# Patient Record
Sex: FEMALE | Race: OTHER | NOT HISPANIC OR LATINO
[De-identification: names, ages, dates, MRNs, and addresses within clinical notes are randomized per-mention and may not be internally consistent; named-entity substitution may affect disease eponyms.]

---

## 2021-08-12 PROBLEM — Z00.00 ENCOUNTER FOR PREVENTIVE HEALTH EXAMINATION: Status: ACTIVE | Noted: 2021-08-12

## 2021-09-15 ENCOUNTER — APPOINTMENT (OUTPATIENT)
Dept: ORTHOPEDIC SURGERY | Facility: CLINIC | Age: 51
End: 2021-09-15

## 2022-05-20 ENCOUNTER — APPOINTMENT (OUTPATIENT)
Dept: GASTROENTEROLOGY | Facility: CLINIC | Age: 52
End: 2022-05-20
Payer: MEDICAID

## 2022-05-20 VITALS
TEMPERATURE: 98 F | DIASTOLIC BLOOD PRESSURE: 80 MMHG | OXYGEN SATURATION: 98 % | WEIGHT: 136 LBS | HEIGHT: 59 IN | HEART RATE: 60 BPM | RESPIRATION RATE: 16 BRPM | BODY MASS INDEX: 27.42 KG/M2 | SYSTOLIC BLOOD PRESSURE: 110 MMHG

## 2022-05-20 DIAGNOSIS — Z80.2 FAMILY HISTORY OF MALIGNANT NEOPLASM OF OTHER RESPIRATORY AND INTRATHORACIC ORGANS: ICD-10-CM

## 2022-05-20 DIAGNOSIS — Z87.19 PERSONAL HISTORY OF OTHER DISEASES OF THE DIGESTIVE SYSTEM: ICD-10-CM

## 2022-05-20 DIAGNOSIS — Z78.9 OTHER SPECIFIED HEALTH STATUS: ICD-10-CM

## 2022-05-20 DIAGNOSIS — Z86.2 PERSONAL HISTORY OF DISEASES OF THE BLOOD AND BLOOD-FORMING ORGANS AND CERTAIN DISORDERS INVOLVING THE IMMUNE MECHANISM: ICD-10-CM

## 2022-05-20 DIAGNOSIS — Z86.39 PERSONAL HISTORY OF OTHER ENDOCRINE, NUTRITIONAL AND METABOLIC DISEASE: ICD-10-CM

## 2022-05-20 PROCEDURE — 99204 OFFICE O/P NEW MOD 45 MIN: CPT

## 2022-05-20 RX ORDER — NICOTINE POLACRILEX 2 MG
GUM BUCCAL
Refills: 0 | Status: ACTIVE | COMMUNITY

## 2022-05-20 NOTE — ASSESSMENT
[FreeTextEntry1] : The patient has chronic constipation which is probably due to colonic inertia.  However she is due for routine follow-up screening colonoscopy.  She may actually be somewhat late if she did indeed have polyps.  She will utilize a MiraLAX prep with additional bottle of mag citrate and 2 Dulcolax 2 days prior.  I recommended that she take Benefiber 1 heaping tablespoon in 6 to 8 ounces of fluid every morning and MiraLAX every evening as well as docusate sodium 100 mg p.o. twice daily and that she try to decrease her use of Dulcolax.  In view of the new onset of heartburn she will also be scheduled for an upper endoscopy to exclude the possibility of reflux esophagitis or Peña's esophagus.  Further therapy will depend upon the results.  She will also obtain a CBC, basic metabolic profile, celiac antibodies and thyroid function tests.  She will obtain iron studies as well. The risks, benefits, complications and possible adverse consequences associated with colonoscopy were discussed with the patient. The risks, benefits, complications and possible adverse consequences associated with upper endoscopy were discussed with the patient.\par \par

## 2022-05-20 NOTE — PHYSICAL EXAM
[General Appearance - Alert] : alert [General Appearance - In No Acute Distress] : in no acute distress [General Appearance - Well Nourished] : well nourished [General Appearance - Well Developed] : well developed [General Appearance - Well-Appearing] : healthy appearing [Sclera] : the sclera and conjunctiva were normal [PERRL With Normal Accommodation] : pupils were equal in size, round, and reactive to light [Extraocular Movements] : extraocular movements were intact [Outer Ear] : the ears and nose were normal in appearance [Hearing Threshold Finger Rub Not [place]] : hearing was normal [Examination Of The Oral Cavity] : the lips and gums were normal [Neck Appearance] : the appearance of the neck was normal [Auscultation Breath Sounds / Voice Sounds] : lungs were clear to auscultation bilaterally [Heart Rate And Rhythm] : heart rate was normal and rhythm regular [Heart Sounds] : normal S1 and S2 [Heart Sounds Gallop] : no gallops [Murmurs] : no murmurs [Heart Sounds Pericardial Friction Rub] : no pericardial rub [Bowel Sounds] : normal bowel sounds [Abdomen Soft] : soft [Abdomen Tenderness] : non-tender [Abdomen Mass (___ Cm)] : no abdominal mass palpated [Abnormal Walk] : normal gait [Nail Clubbing] : no clubbing  or cyanosis of the fingernails [Musculoskeletal - Swelling] : no joint swelling seen [Motor Tone] : muscle strength and tone were normal [Skin Color & Pigmentation] : normal skin color and pigmentation [Skin Turgor] : normal skin turgor [] : no rash [Motor Exam] : the motor exam was normal [No Focal Deficits] : no focal deficits [Oriented To Time, Place, And Person] : oriented to person, place, and time [Impaired Insight] : insight and judgment were intact [Affect] : the affect was normal

## 2022-05-20 NOTE — HISTORY OF PRESENT ILLNESS
[de-identified] : The patient has a history of chronic constipation and the frequency of her bowel movements depends upon how often she takes a laxative and her diet.  When she consumes a high-fiber diet she will have more normal and regular bowel movements but otherwise she may not have a bowel movement for 2 to 3 days and will take Dulcolax averaging 2 or 3 times weekly.  Her stools are frequently quite firm.  There is no rectal bleeding or melena.  She apparently underwent a colonoscopy 10 to 12 years ago and may have undergone removal of a polyp and was told to undergo a follow-up exam in 5 years.  For the past 2 months she has been experiencing recurrent heartburn averaging 2 or 3 times weekly but there is no dysphagia.  There is no nausea or vomiting.  The heartburn is more likely to occur when she lies down.  Her appetite and weight are stable.  She still has her menses and apparently has a diagnosis of anemia for which she takes 1 iron tablet daily.

## 2022-07-05 LAB
ANION GAP SERPL CALC-SCNC: 11 MMOL/L
BASOPHILS # BLD AUTO: 0.05 K/UL
BASOPHILS NFR BLD AUTO: 0.9 %
BUN SERPL-MCNC: 13 MG/DL
CALCIUM SERPL-MCNC: 9.2 MG/DL
CHLORIDE SERPL-SCNC: 105 MMOL/L
CO2 SERPL-SCNC: 23 MMOL/L
CREAT SERPL-MCNC: 0.7 MG/DL
CRP SERPL-MCNC: <3 MG/L
EGFR: 104 ML/MIN/1.73M2
ENDOMYSIUM IGA SER QL: NEGATIVE
ENDOMYSIUM IGA TITR SER: NORMAL
EOSINOPHIL # BLD AUTO: 0.11 K/UL
EOSINOPHIL NFR BLD AUTO: 1.9 %
GLIADIN IGA SER QL: <5 UNITS
GLIADIN IGG SER QL: <5 UNITS
GLIADIN PEPTIDE IGA SER-ACNC: NEGATIVE
GLIADIN PEPTIDE IGG SER-ACNC: NEGATIVE
GLUCOSE SERPL-MCNC: 87 MG/DL
HCT VFR BLD CALC: 41.3 %
HGB BLD-MCNC: 12.7 G/DL
IGA SER QL IEP: 182 MG/DL
IMM GRANULOCYTES NFR BLD AUTO: 0.2 %
LYMPHOCYTES # BLD AUTO: 1.9 K/UL
LYMPHOCYTES NFR BLD AUTO: 33 %
MAN DIFF?: NORMAL
MCHC RBC-ENTMCNC: 29.4 PG
MCHC RBC-ENTMCNC: 30.8 GM/DL
MCV RBC AUTO: 95.6 FL
MONOCYTES # BLD AUTO: 0.43 K/UL
MONOCYTES NFR BLD AUTO: 7.5 %
NEUTROPHILS # BLD AUTO: 3.25 K/UL
NEUTROPHILS NFR BLD AUTO: 56.5 %
PLATELET # BLD AUTO: 310 K/UL
POTASSIUM SERPL-SCNC: 4.3 MMOL/L
RBC # BLD: 4.32 M/UL
RBC # FLD: 16.9 %
SODIUM SERPL-SCNC: 138 MMOL/L
T3FREE SERPL-MCNC: 2.52 PG/ML
T4 FREE SERPL-MCNC: 0.8 NG/DL
TSH SERPL-ACNC: 2.63 UIU/ML
TTG IGA SER IA-ACNC: 6.2 U/ML
TTG IGA SER-ACNC: ABNORMAL
TTG IGG SER IA-ACNC: 4.5 U/ML
TTG IGG SER IA-ACNC: NEGATIVE
WBC # FLD AUTO: 5.75 K/UL

## 2022-07-11 ENCOUNTER — TRANSCRIPTION ENCOUNTER (OUTPATIENT)
Age: 52
End: 2022-07-11

## 2022-07-12 ENCOUNTER — APPOINTMENT (OUTPATIENT)
Dept: GASTROENTEROLOGY | Facility: GI CENTER | Age: 52
End: 2022-07-12

## 2022-07-12 ENCOUNTER — RESULT REVIEW (OUTPATIENT)
Age: 52
End: 2022-07-12

## 2022-07-12 ENCOUNTER — OUTPATIENT (OUTPATIENT)
Dept: OUTPATIENT SERVICES | Facility: HOSPITAL | Age: 52
LOS: 1 days | End: 2022-07-12
Payer: COMMERCIAL

## 2022-07-12 DIAGNOSIS — K21.9 GASTRO-ESOPHAGEAL REFLUX DISEASE W/OUT ESOPHAGITIS: ICD-10-CM

## 2022-07-12 DIAGNOSIS — K59.09 OTHER CONSTIPATION: ICD-10-CM

## 2022-07-12 DIAGNOSIS — Z12.11 ENCOUNTER FOR SCREENING FOR MALIGNANT NEOPLASM OF COLON: ICD-10-CM

## 2022-07-12 DIAGNOSIS — K21.9 GASTRO-ESOPHAGEAL REFLUX DISEASE WITHOUT ESOPHAGITIS: ICD-10-CM

## 2022-07-12 PROCEDURE — 88305 TISSUE EXAM BY PATHOLOGIST: CPT

## 2022-07-12 PROCEDURE — 45378 DIAGNOSTIC COLONOSCOPY: CPT

## 2022-07-12 PROCEDURE — 88342 IMHCHEM/IMCYTCHM 1ST ANTB: CPT

## 2022-07-12 PROCEDURE — 88305 TISSUE EXAM BY PATHOLOGIST: CPT | Mod: 26

## 2022-07-12 PROCEDURE — 43239 EGD BIOPSY SINGLE/MULTIPLE: CPT | Mod: 59

## 2022-07-12 PROCEDURE — 43239 EGD BIOPSY SINGLE/MULTIPLE: CPT

## 2022-07-12 PROCEDURE — 45378 DIAGNOSTIC COLONOSCOPY: CPT | Mod: 33

## 2022-07-12 PROCEDURE — 88342 IMHCHEM/IMCYTCHM 1ST ANTB: CPT | Mod: 26

## 2022-07-12 NOTE — PHYSICAL EXAM
[General Appearance - Alert] : alert [General Appearance - In No Acute Distress] : in no acute distress [General Appearance - Well Nourished] : well nourished [General Appearance - Well Developed] : well developed [General Appearance - Well-Appearing] : healthy appearing [Sclera] : the sclera and conjunctiva were normal [PERRL With Normal Accommodation] : pupils were equal in size, round, and reactive to light [Extraocular Movements] : extraocular movements were intact [Outer Ear] : the ears and nose were normal in appearance [Hearing Threshold Finger Rub Not [place]] : hearing was normal [Examination Of The Oral Cavity] : the lips and gums were normal [Neck Appearance] : the appearance of the neck was normal [Auscultation Breath Sounds / Voice Sounds] : lungs were clear to auscultation bilaterally [Heart Rate And Rhythm] : heart rate was normal and rhythm regular [Heart Sounds] : normal S1 and S2 [Heart Sounds Gallop] : no gallops [Murmurs] : no murmurs [Heart Sounds Pericardial Friction Rub] : no pericardial rub [Bowel Sounds] : normal bowel sounds [Abdomen Soft] : soft [Abdomen Tenderness] : non-tender [Abdomen Mass (___ Cm)] : no abdominal mass palpated [Abnormal Walk] : normal gait [Nail Clubbing] : no clubbing  or cyanosis of the fingernails [Musculoskeletal - Swelling] : no joint swelling seen [Motor Tone] : muscle strength and tone were normal [Skin Color & Pigmentation] : normal skin color and pigmentation [Skin Turgor] : normal skin turgor [] : no rash [Motor Exam] : the motor exam was normal [No Focal Deficits] : no focal deficits [Oriented To Time, Place, And Person] : oriented to person, place, and time [Impaired Insight] : insight and judgment were intact [Affect] : the affect was normal

## 2022-07-14 LAB — SURGICAL PATHOLOGY STUDY: SIGNIFICANT CHANGE UP

## 2022-11-02 ENCOUNTER — APPOINTMENT (OUTPATIENT)
Dept: GASTROENTEROLOGY | Facility: CLINIC | Age: 52
End: 2022-11-02

## 2022-11-02 VITALS
HEIGHT: 59 IN | OXYGEN SATURATION: 98 % | BODY MASS INDEX: 27.01 KG/M2 | WEIGHT: 134 LBS | HEART RATE: 62 BPM | SYSTOLIC BLOOD PRESSURE: 114 MMHG | DIASTOLIC BLOOD PRESSURE: 76 MMHG | RESPIRATION RATE: 14 BRPM

## 2022-11-02 DIAGNOSIS — R79.89 OTHER SPECIFIED ABNORMAL FINDINGS OF BLOOD CHEMISTRY: ICD-10-CM

## 2022-11-02 PROCEDURE — 99215 OFFICE O/P EST HI 40 MIN: CPT

## 2022-11-02 RX ORDER — DOCUSATE SODIUM 100 MG
100 TABLET ORAL
Qty: 60 | Refills: 5 | Status: DISCONTINUED | COMMUNITY
Start: 2022-05-20 | End: 2022-11-02

## 2022-11-02 RX ORDER — ROSUVASTATIN CALCIUM 5 MG/1
TABLET, FILM COATED ORAL
Refills: 0 | Status: DISCONTINUED | COMMUNITY
End: 2022-11-02

## 2022-11-03 NOTE — ASSESSMENT
[FreeTextEntry1] : Labs ordered to rule out various etiologies of liver enzyme elevation.\par If negative LFT elevation likely from hepatic steatosis.\par Follow-up in 5 weeks to discuss labs and imaging.

## 2022-11-03 NOTE — HISTORY OF PRESENT ILLNESS
[de-identified] : 4 life transfer factor\par Cholesterol [FreeTextEntry1] : 52-year-old female patient with no significant past medical history was found to have elevated LFTs while being evaluated by GI for altered bowel movements.\par Denies significant alcohol intake.\par Does give a history of supplement intake the name of which is noted above.\par Denies family history of liver disease.\par Denies risk factors for viral hepatitis.\par Denies history of autoimmunity.\par Denies history of multiple miscarriages in herself or family.\par Denies Abdominal pain, Abdominal distension, Jaundice,Pedal Edema,Hematemesis,Melena,Confusion.\par Physical exam:\par Gen: A&Ox3, NAD\par HEENT: normal outer ears & nose, PERRL, mucus membranes moist, anicteric, no lymphadenopathy\par CVS: regular rate and rhythm, no murmur\par Pulm: vesicular breath sounds\par Abdomen: normal bowel sounds, soft, nontender, no hepatosplenomegaly\par Legs: no edema, no clubbing\par Musculoskeletal: normal gait\par Skin: no rash\par Neuro: no asterixis, EOMI\par Psych: normal affect\par \par

## 2022-12-03 ENCOUNTER — LABORATORY RESULT (OUTPATIENT)
Age: 52
End: 2022-12-03

## 2022-12-14 ENCOUNTER — APPOINTMENT (OUTPATIENT)
Dept: GASTROENTEROLOGY | Facility: CLINIC | Age: 52
End: 2022-12-14

## 2022-12-14 VITALS
TEMPERATURE: 97.5 F | HEART RATE: 64 BPM | DIASTOLIC BLOOD PRESSURE: 60 MMHG | SYSTOLIC BLOOD PRESSURE: 120 MMHG | WEIGHT: 134 LBS | HEIGHT: 59 IN | OXYGEN SATURATION: 98 % | BODY MASS INDEX: 27.01 KG/M2 | RESPIRATION RATE: 14 BRPM

## 2022-12-14 PROCEDURE — 99215 OFFICE O/P EST HI 40 MIN: CPT

## 2022-12-14 PROCEDURE — T1013: CPT

## 2022-12-14 NOTE — REASON FOR VISIT
[Follow-Up: _____] : a [unfilled] follow-up visit [[employer] ] : provided by [employer]   [Time Spent: ____ minutes] : Total time spent using  services: [unfilled] minutes. The patient's primary language is not English thus required  services. [FreeTextEntry1] : elevated LFTs [Interpreters_IDNumber] : [id number] [Interpreters_FullName] : Luisito [TWNoteComboBox1] : Tongan

## 2022-12-14 NOTE — PHYSICAL EXAM
[Non-Tender] : non-tender [Smooth] : smooth [General Appearance - Alert] : alert [General Appearance - In No Acute Distress] : in no acute distress [Sclera] : the sclera and conjunctiva were normal [Outer Ear] : the ears and nose were normal in appearance [Oropharynx] : the oropharynx was normal [Neck Appearance] : the appearance of the neck was normal [Edema] : there was no peripheral edema [Bowel Sounds] : normal bowel sounds [Abdomen Soft] : soft [Abdomen Tenderness] : non-tender [Abdomen Mass (___ Cm)] : no abdominal mass palpated [Abnormal Walk] : normal gait [Musculoskeletal - Swelling] : no joint swelling seen [Nail Clubbing] : no clubbing  or cyanosis of the fingernails [Motor Tone] : muscle strength and tone were normal [Skin Color & Pigmentation] : normal skin color and pigmentation [Skin Turgor] : normal skin turgor [] : no rash [No Focal Deficits] : no focal deficits [Oriented To Time, Place, And Person] : oriented to person, place, and time [Impaired Insight] : insight and judgment were intact [Affect] : the affect was normal [Scleral Icterus] : No Scleral Icterus [Spider Angioma] : No spider angioma(s) were observed [Abdominal  Ascites] : no ascites [Splenomegaly] : no splenomegaly [Asterixis] : no asterixis observed [Jaundice] : No jaundice [Depression] : no depression [Hallucinations] : ~T no ~M hallucinations

## 2022-12-14 NOTE — ASSESSMENT
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

## 2022-12-14 NOTE — HISTORY OF PRESENT ILLNESS
[de-identified] : VINCENT EVERETT is a 52 year old female with a PMH significant for high cholesterol and anemia.\par \par 11/2/22: 52-year-old female patient with no significant past medical history was found to have elevated LFTs while being evaluated by GI for altered bowel movements.\par Denies significant alcohol intake.\par Does give a history of supplement intake the name of which is noted above.\par Denies family history of liver disease.\par Denies risk factors for viral hepatitis.\par Denies history of autoimmunity.\par Denies history of multiple miscarriages in herself or family.\par Denies Abdominal pain, Abdominal distension, Jaundice,Pedal Edema,Hematemesis,Melena,Confusion.\par \par 12/14/22: Atorvastatin started in July then stopped it in September. LFTs found to be elevated on September labs. Abdominal ultrasound done in October was normal. Labs ordered at last visit reviewed with pt from last visit. LFTs downtrending. Bilirubin 0.9, AST 37 (63), ALT 37 (71),  (202), Alpha 1 normal, ceruloplasmin 26, TFTs normal. HBV and HCV serologies negative. Pt is not immune to HBV. HAV antibody positive but IgM negative indicating resolved infection. HEV negative, ASMA weakly positive at 1:20 but SAYDA negative and IGG normal. Ferritin low at 9 but iron studies overall normal. Pt has a known anemia diagnosis.

## 2022-12-15 LAB
A1AT PHENOTYP SERPL-IMP: NORMAL
A1AT SERPL-MCNC: 130 MG/DL
A1AT SERPL-MCNC: 132 MG/DL
ALBUMIN SERPL ELPH-MCNC: 4.2 G/DL
ALP BLD-CCNC: 114 U/L
ALT SERPL-CCNC: 37 U/L
ANA SER IF-ACNC: NEGATIVE
ANION GAP SERPL CALC-SCNC: 10 MMOL/L
AST SERPL-CCNC: 37 U/L
BILIRUB INDIRECT SERPL-MCNC: 0.8 MG/DL
BILIRUB SERPL-MCNC: 0.9 MG/DL
BUN SERPL-MCNC: 17 MG/DL
CALCIUM SERPL-MCNC: 9.3 MG/DL
CERULOPLASMIN SERPL-MCNC: 26 MG/DL
CHLORIDE SERPL-SCNC: 106 MMOL/L
CO2 SERPL-SCNC: 22 MMOL/L
CREAT SERPL-MCNC: 0.76 MG/DL
DEPRECATED KAPPA LC FREE/LAMBDA SER: 1.26 RATIO
EGFR: 94 ML/MIN/1.73M2
FERRITIN SERPL-MCNC: 9 NG/ML
GLUCOSE SERPL-MCNC: 88 MG/DL
HBV CORE IGG+IGM SER QL: NONREACTIVE
HBV CORE IGM SER QL: NONREACTIVE
HBV SURFACE AB SER QL: NONREACTIVE
HBV SURFACE AG SER QL: NONREACTIVE
HCV AB SER QL: NONREACTIVE
HCV S/CO RATIO: 0.11 S/CO
HEPATITIS A IGG ANTIBODY: REACTIVE
HEV AB SER QL: NEGATIVE
IGA SER QL IEP: 216 MG/DL
IGG SER QL IEP: 1369 MG/DL
IGM SER QL IEP: 71 MG/DL
INR PPP: 1.08 RATIO
IRON SATN MFR SERPL: 23 %
IRON SERPL-MCNC: 88 UG/DL
KAPPA LC CSF-MCNC: 1.6 MG/DL
KAPPA LC SERPL-MCNC: 2.02 MG/DL
LKM AB SER QL IF: <20.1 UNITS
MITOCHONDRIA AB SER IF-ACNC: NORMAL
NT-PROBNP SERPL-MCNC: 31 PG/ML
POTASSIUM SERPL-SCNC: 4.4 MMOL/L
PROT SERPL-MCNC: 6.8 G/DL
PT BLD: 12.7 SEC
SMOOTH MUSCLE AB SER QL IF: ABNORMAL
SODIUM SERPL-SCNC: 138 MMOL/L
SOLUBLE LIVER IGG SER IA-ACNC: 0.7
TIBC SERPL-MCNC: 375 UG/DL
TSH SERPL-ACNC: 2.5 UIU/ML
UIBC SERPL-MCNC: 287 UG/DL

## (undated) DEVICE — VALVE ENDOSCOPE DEFENDO SINGLE USE

## (undated) DEVICE — FORCEP RADIAL JAW 4 W NDL 2.4MM 2.8MM 240CM ORANGE DISP